# Patient Record
Sex: FEMALE | Employment: STUDENT | URBAN - METROPOLITAN AREA
[De-identification: names, ages, dates, MRNs, and addresses within clinical notes are randomized per-mention and may not be internally consistent; named-entity substitution may affect disease eponyms.]

---
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
## 2021-01-28 DIAGNOSIS — Z23 ENCOUNTER FOR IMMUNIZATION: ICD-10-CM

## 2021-02-04 ENCOUNTER — IMMUNIZATIONS (OUTPATIENT)
Dept: FAMILY MEDICINE CLINIC | Facility: HOSPITAL | Age: 21
End: 2021-02-04

## 2021-02-04 DIAGNOSIS — Z23 ENCOUNTER FOR IMMUNIZATION: Primary | ICD-10-CM

## 2021-02-04 PROCEDURE — 0001A SARS-COV-2 / COVID-19 MRNA VACCINE (PFIZER-BIONTECH) 30 MCG: CPT

## 2021-02-04 PROCEDURE — 91300 SARS-COV-2 / COVID-19 MRNA VACCINE (PFIZER-BIONTECH) 30 MCG: CPT

## 2021-02-25 ENCOUNTER — IMMUNIZATIONS (OUTPATIENT)
Dept: FAMILY MEDICINE CLINIC | Facility: HOSPITAL | Age: 21
End: 2021-02-25

## 2021-02-25 DIAGNOSIS — Z23 ENCOUNTER FOR IMMUNIZATION: Primary | ICD-10-CM

## 2021-02-25 PROCEDURE — 0002A SARS-COV-2 / COVID-19 MRNA VACCINE (PFIZER-BIONTECH) 30 MCG: CPT

## 2021-02-25 PROCEDURE — 91300 SARS-COV-2 / COVID-19 MRNA VACCINE (PFIZER-BIONTECH) 30 MCG: CPT

## 2023-07-24 ENCOUNTER — APPOINTMENT (OUTPATIENT)
Dept: LAB | Facility: CLINIC | Age: 23
End: 2023-07-24

## 2023-07-24 DIAGNOSIS — Z02.1 PRE-EMPLOYMENT HEALTH SCREENING EXAMINATION: ICD-10-CM

## 2023-07-24 LAB
MEV IGG SER QL IA: NORMAL
MUV IGG SER QL IA: NORMAL
RUBV IGG SERPL IA-ACNC: 209.1 IU/ML
VZV IGG SER QL IA: NORMAL

## 2023-07-24 PROCEDURE — 86787 VARICELLA-ZOSTER ANTIBODY: CPT

## 2023-07-24 PROCEDURE — 86735 MUMPS ANTIBODY: CPT

## 2023-07-24 PROCEDURE — 36415 COLL VENOUS BLD VENIPUNCTURE: CPT

## 2023-07-24 PROCEDURE — 86762 RUBELLA ANTIBODY: CPT

## 2023-07-24 PROCEDURE — 86765 RUBEOLA ANTIBODY: CPT

## 2023-07-24 PROCEDURE — 86480 TB TEST CELL IMMUN MEASURE: CPT

## 2023-07-29 LAB
GAMMA INTERFERON BACKGROUND BLD IA-ACNC: 0.02 IU/ML
M TB IFN-G BLD-IMP: NEGATIVE
M TB IFN-G CD4+ BCKGRND COR BLD-ACNC: 0 IU/ML
M TB IFN-G CD4+ BCKGRND COR BLD-ACNC: 0 IU/ML
MITOGEN IGNF BCKGRD COR BLD-ACNC: >10 IU/ML

## 2024-10-03 ENCOUNTER — HOSPITAL ENCOUNTER (EMERGENCY)
Facility: HOSPITAL | Age: 24
Discharge: HOME | End: 2024-10-03
Attending: EMERGENCY MEDICINE | Admitting: EMERGENCY MEDICINE
Payer: OTHER MISCELLANEOUS

## 2024-10-03 VITALS
DIASTOLIC BLOOD PRESSURE: 99 MMHG | HEART RATE: 76 BPM | RESPIRATION RATE: 20 BRPM | SYSTOLIC BLOOD PRESSURE: 159 MMHG | TEMPERATURE: 98.1 F | OXYGEN SATURATION: 98 %
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
## 2024-10-03 LAB
ALT SERPL W P-5'-P-CCNC: 15 U/L (ref 7–52)
HBV SURFACE AB SER-ACNC: >500 MIU/ML
HBV SURFACE AG SER QL: NORMAL
HCV AB SER QL: NORMAL
HIV 1+2 AB+HIV1 P24 AG SERPL QL IA: NORMAL
HIV 2 AB SERPL QL IA: NORMAL
HIV1 AB SERPL QL IA: NORMAL
HIV1 P24 AG SERPL QL IA: NORMAL

## 2024-10-03 PROCEDURE — 87340 HEPATITIS B SURFACE AG IA: CPT | Performed by: EMERGENCY MEDICINE

## 2024-10-03 PROCEDURE — 99283 EMERGENCY DEPT VISIT LOW MDM: CPT

## 2024-10-03 PROCEDURE — 86803 HEPATITIS C AB TEST: CPT | Performed by: EMERGENCY MEDICINE

## 2024-10-03 PROCEDURE — 99284 EMERGENCY DEPT VISIT MOD MDM: CPT | Performed by: EMERGENCY MEDICINE

## 2024-10-03 PROCEDURE — 36415 COLL VENOUS BLD VENIPUNCTURE: CPT | Performed by: EMERGENCY MEDICINE

## 2024-10-03 PROCEDURE — 87389 HIV-1 AG W/HIV-1&-2 AB AG IA: CPT | Performed by: EMERGENCY MEDICINE

## 2024-10-03 PROCEDURE — 86706 HEP B SURFACE ANTIBODY: CPT | Performed by: EMERGENCY MEDICINE

## 2024-10-03 PROCEDURE — 84460 ALANINE AMINO (ALT) (SGPT): CPT | Performed by: EMERGENCY MEDICINE

## 2024-10-03 NOTE — ED PROVIDER NOTES
Emergency Department Employee Health Note  Neyda Limon 24 y.o. female MRN: [id number]  Unit/Bed#: ED 09/ED 09 Encounter: [id number]        History of Present Illness     Chief Complaint:   Chief Complaint   Patient presents with    Infectious Exposure - Needlestick     Pt was administering heparin to a pt and and the pt receiving the injection flinched with removal and pt believes she may have got stuck in left hand index finger around 430 am     HPI:  Neyda Limon is a 24 y.o. female who presents with needlestick injury. Patient was administering the heparin subq to a patient who then jolted back and cause the needle to maybe go into her left index finger. Patient is not entirely sure if it punctured or not but became concerned rightfully so and presented for exposure panel.  Mechanism:           HPI  Review of Systems   Constitutional:  Negative for chills and fever.   HENT:  Negative for hearing loss.    Eyes:  Negative for visual disturbance.   Respiratory:  Negative for shortness of breath.    Cardiovascular:  Negative for chest pain.   Gastrointestinal:  Negative for abdominal pain, constipation, diarrhea, nausea and vomiting.   Genitourinary:  Negative for difficulty urinating.   Musculoskeletal:  Negative for myalgias.   Skin:  Negative for color change.   Neurological:  Negative for dizziness and headaches.   Psychiatric/Behavioral:  Negative for agitation.    All other systems reviewed and are negative.      Historical Information     Immunizations:   Immunization History   Administered Date(s) Administered    COVID-19 PFIZER VACCINE 0.3 ML IM 02/04/2021, 02/25/2021, 01/14/2022    DTaP 2001, 2001, 2001, 09/04/2002, 08/31/2006    DTaP / HiB / IPV 2001, 2001, 02/11/2002, 05/22/2002    HPV Quadrivalent 07/08/2020, 08/18/2020    HPV9 05/17/2021    Hep B, adult 2001, 2001, 2001, 06/04/2020    INFLUENZA 01/09/2020, 11/10/2021    IPV 2001, 2001,  09/04/2002, 08/31/2006    Influenza Split High Dose Preservative Free IM 01/09/2020    MMR 05/22/2002, 03/27/2007    Meningococcal B, OMV (BEXSERO) 07/08/2020, 08/18/2020    Meningococcal Conjugate (MCV4O) 07/23/2012, 07/24/2019    Pneumococcal Conjugate 13-Valent 2001, 2001, 02/11/2002    Tdap 07/23/2012, 07/08/2020    Tuberculin Skin Test-PPD Intradermal 05/19/2020    Varicella 03/20/2002, 07/24/2019       No past medical history on file.  No family history on file.  No past surgical history on file.  Social History     Tobacco Use    Smoking status: Never    Smokeless tobacco: Never   Substance Use Topics    Alcohol use: Never     E-Cigarette/Vaping     E-Cigarette/Vaping Substances         Meds/Allergies   None       Allergies   Allergen Reactions    Sulfa Antibiotics Hives and Rash       PHYSICAL EXAM  Physical Exam  Vitals and nursing note reviewed.   Constitutional:       General: She is not in acute distress.     Appearance: Normal appearance. She is well-developed. She is not ill-appearing.   HENT:      Head: Normocephalic and atraumatic.      Right Ear: External ear normal.      Left Ear: External ear normal.      Nose: Nose normal.      Mouth/Throat:      Mouth: Mucous membranes are moist.      Pharynx: Oropharynx is clear.   Eyes:      General:         Right eye: No discharge.         Left eye: No discharge.      Extraocular Movements: Extraocular movements intact.      Conjunctiva/sclera: Conjunctivae normal.      Pupils: Pupils are equal, round, and reactive to light.   Cardiovascular:      Rate and Rhythm: Normal rate and regular rhythm.      Heart sounds: Normal heart sounds. No murmur heard.     No friction rub. No gallop.   Pulmonary:      Effort: Pulmonary effort is normal. No respiratory distress.      Breath sounds: Normal breath sounds. No stridor. No wheezing.   Abdominal:      General: There is no distension.   Musculoskeletal:         General: No deformity. Normal range of  "motion.      Cervical back: Normal range of motion and neck supple. No rigidity.   Skin:     General: Skin is warm and dry.      Capillary Refill: Capillary refill takes less than 2 seconds.   Neurological:      General: No focal deficit present.      Mental Status: She is alert and oriented to person, place, and time. Mental status is at baseline.      Gait: Gait normal.   Psychiatric:         Mood and Affect: Mood normal.         Behavior: Behavior normal.         Vital Signs  ED Triage Vitals   Temperature Pulse Respirations Blood Pressure SpO2   10/03/24 0617 10/03/24 0613 10/03/24 0613 10/03/24 0613 10/03/24 0613   98.1 °F (36.7 °C) 76 20 159/99 98 %      Temp Source Heart Rate Source Patient Position - Orthostatic VS BP Location FiO2 (%)   10/03/24 0617 10/03/24 0613 -- -- --   Temporal Monitor         Pain Score       --                  Vitals:    10/03/24 0613   BP: 159/99   Pulse: 76         Certification of Exposure:    (link to Employee Health Services: [facility] ([email or web address]): find link to BBP Exposure Instructions under important links on center of the page)    The patient is stable and has a history and physical exam consistent with an Blood Exposure and based on my assessment this exposure is Significant     If “NonSignificant” nothing further needs to be filled out.  If \"Significant\" please continue with the following questions    For Significant Exposures All of the following items must be completed:     Source Patient Name: Petra Leon    Source Patient MRN: [id number]   Was the Source Patient's Provider contacted Yes, Providers name: ROSLYN Oneil   Was \"Exposure Panel - Source\" ordered (including Rapid HIV, HBsAg and anti-HCV) for Source Patient: Yes    Exposure Information    Type of Body Fluid Exposure: blood    Estimated volume of fluid: small up to 5cc     Exposure area:  intact skin    Skin Integrity: punctured    ED provider should review source " patient chart for known history of HIV, Hepatitis B and Hepatitis C infection    Source patient HIV positive: No  Was the On-call Infectious Disease Provider contacted: No  Discussed treatment options with/for employee: Yes    Immunization History   Administered Date(s) Administered    COVID-19 PFIZER VACCINE 0.3 ML IM 02/04/2021, 02/25/2021, 01/14/2022    DTaP 2001, 2001, 2001, 09/04/2002, 08/31/2006    DTaP / HiB / IPV 2001, 2001, 02/11/2002, 05/22/2002    HPV Quadrivalent 07/08/2020, 08/18/2020    HPV9 05/17/2021    Hep B, adult 2001, 2001, 2001, 06/04/2020    INFLUENZA 01/09/2020, 11/10/2021    IPV 2001, 2001, 09/04/2002, 08/31/2006    Influenza Split High Dose Preservative Free IM 01/09/2020    MMR 05/22/2002, 03/27/2007    Meningococcal B, OMV (BEXSERO) 07/08/2020, 08/18/2020    Meningococcal Conjugate (MCV4O) 07/23/2012, 07/24/2019    Pneumococcal Conjugate 13-Valent 2001, 2001, 02/11/2002    Tdap 07/23/2012, 07/08/2020    Tuberculin Skin Test-PPD Intradermal 05/19/2020    Varicella 03/20/2002, 07/24/2019       Hepatitis B Vaccine History:    Immunization History   Administered Date(s) Administered    COVID-19 PFIZER VACCINE 0.3 ML IM 02/04/2021, 02/25/2021, 01/14/2022    DTaP 2001, 2001, 2001, 09/04/2002, 08/31/2006    DTaP / HiB / IPV 2001, 2001, 02/11/2002, 05/22/2002    HPV Quadrivalent 07/08/2020, 08/18/2020    HPV9 05/17/2021    Hep B, adult 2001, 2001, 2001, 06/04/2020    INFLUENZA 01/09/2020, 11/10/2021    IPV 2001, 2001, 09/04/2002, 08/31/2006    Influenza Split High Dose Preservative Free IM 01/09/2020    MMR 05/22/2002, 03/27/2007    Meningococcal B, OMV (BEXSERO) 07/08/2020, 08/18/2020    Meningococcal Conjugate (MCV4O) 07/23/2012, 07/24/2019    Pneumococcal Conjugate 13-Valent 2001, 2001, 02/11/2002    Tdap 07/23/2012, 07/08/2020    Tuberculin Skin  "Test-PPD Intradermal 05/19/2020    Varicella 03/20/2002, 07/24/2019       The patient has Previously been vaccinated for Hepatitis B.     HEPATITIS B IMMUNE GLOBULIN:  Not Indicated    Tetanus Vaccine History:    TDAP vaccination date: UTD    The patient has Tdap reported as up to date    Employee/Patient post exposure testing Has been performed.     \"Exposure Panel - Employee Baseline\"  (includes HBsAg, HBsAb, anti-HCV, ALT, HIV) with verbal consent and pretesting counseling for the patient Has been ordered.    Post-exposure Prophylaxis treatment options were discussed today: Not Indicated      Visual Acuity      ED Medications  Medications - No data to display    Diagnostic Studies  Results Reviewed       Procedure Component Value Units Date/Time    Hepatitis B surface antigen [[id number]] Collected: 10/03/24 0618    Lab Status: No result Specimen: Blood from Arm, Left     Hepatitis C antibody [[id number]] Collected: 10/03/24 0618    Lab Status: No result Specimen: Blood from Arm, Left     HIV 1/2 AG/AB w Reflex [facility] for 2 yr old and above [[id number]] Collected: 10/03/24 0618    Lab Status: No result Specimen: Blood from Arm, Left     ALT [[id number]] Collected: 10/03/24 0618    Lab Status: No result Specimen: Blood from Arm, Left     Hepatitis B surface antibody [[id number]] Collected: 10/03/24 0618    Lab Status: No result Specimen: Blood from Arm, Left                No orders to display              Procedures  Procedures         Medical Decision Making  Amount and/or Complexity of Data Reviewed  Labs: ordered.        Disposition  Final diagnoses:   Accidental needlestick injury with exposure to body fluid     Time reflects when diagnosis was documented in both MDM as applicable and the Disposition within this note       Time User Action Codes Description Comment    10/3/2024  6:19 AM Eriberto Yanez Add [W46.1XXA] Accidental needlestick injury with exposure to body fluid           ED Disposition       ED " Disposition   Discharge    Condition   Stable    Date/Time   Thu Oct 3, 2024  6:19 AM    Comment   Neyda Limon discharge to home/self care.                   Follow-up Information       Follow up With Specialties Details Why Contact Info Additional Information    [facility]  Call  To discuss follow up [address] [facility] [address]  [phone]     Critical access hospital Emergency Department Emergency Medicine Go to  If symptoms worsen, As needed [address] [facility] [id number] [phone] [facility] Emergency Department, [address] [place] [address], [id number]            Patient's Medications    No medications on file       No discharge procedures on file.    PDMP Review       None              ED Provider  Electronically Signed by               Eriberto Yanez MD  10/03/24 0622

## 2024-10-03 NOTE — DISCHARGE INSTRUCTIONS
Please call employee health to discuss follow-up.    You are able to follow-up on the results through the [employer] mirna.

## 2024-12-24 DIAGNOSIS — Z20.1 EXPOSURE TO TB: Primary | ICD-10-CM

## 2024-12-24 DIAGNOSIS — Z11.1 SCREENING-PULMONARY TB: ICD-10-CM

## 2024-12-27 ENCOUNTER — APPOINTMENT (OUTPATIENT)
Dept: LAB | Facility: CLINIC | Age: 24
End: 2024-12-27

## 2024-12-27 DIAGNOSIS — Z20.1 EXPOSURE TO TB: ICD-10-CM

## 2024-12-27 PROCEDURE — 86480 TB TEST CELL IMMUN MEASURE: CPT

## 2024-12-27 PROCEDURE — 36415 COLL VENOUS BLD VENIPUNCTURE: CPT

## 2024-12-28 LAB
GAMMA INTERFERON BACKGROUND BLD IA-ACNC: 0.03 IU/ML
M TB IFN-G BLD-IMP: NEGATIVE
M TB IFN-G CD4+ BCKGRND COR BLD-ACNC: -0.02 IU/ML
M TB IFN-G CD4+ BCKGRND COR BLD-ACNC: 0.01 IU/ML
MITOGEN IGNF BCKGRD COR BLD-ACNC: 9.97 IU/ML

## 2025-02-13 DIAGNOSIS — Z01.84 IMMUNITY STATUS TESTING: Primary | ICD-10-CM

## 2025-02-20 ENCOUNTER — APPOINTMENT (OUTPATIENT)
Dept: LAB | Facility: HOSPITAL | Age: 25
End: 2025-02-20

## 2025-02-20 DIAGNOSIS — Z01.84 IMMUNITY STATUS TESTING: ICD-10-CM

## 2025-02-20 PROCEDURE — 36415 COLL VENOUS BLD VENIPUNCTURE: CPT

## 2025-02-20 PROCEDURE — 86480 TB TEST CELL IMMUN MEASURE: CPT

## 2025-02-21 LAB
GAMMA INTERFERON BACKGROUND BLD IA-ACNC: 0.02 IU/ML
M TB IFN-G BLD-IMP: NEGATIVE
M TB IFN-G CD4+ BCKGRND COR BLD-ACNC: 0.02 IU/ML
M TB IFN-G CD4+ BCKGRND COR BLD-ACNC: 0.02 IU/ML
MITOGEN IGNF BCKGRD COR BLD-ACNC: 9.98 IU/ML